# Patient Record
(demographics unavailable — no encounter records)

---

## 2025-07-21 NOTE — DISCUSSION/SUMMARY
[de-identified] : Acute on chronic lower back pain with acute on chronic left lumbar radiucopathy; These symptoms have been quite severe and required three days off of work last week. -MRI lumbar ordered which is imperative to help evaluate for herniated disc/stenosis and guide further treatment including appropriate PT, epidural steroid injections and/or surgery.  It is imperative to help us understand her pattern of symptoms to guide activities such as horse back riding -Meloxicam Rx given with instructions -f/u after MRI.

## 2025-07-21 NOTE — HISTORY OF PRESENT ILLNESS
[de-identified] : DALY GUNTER is a 29 year-year-old female who presents today with complaints of acute on chronic lower back pain for the past week. The pain is described as sharp, aching, and is intermittent. Patient reports the pain began suddenly and was in much pain couldn't walk for 3 days barely nor work. Symptoms are exacerbated by walking, bending, lifting and alleviated by Lidocaine patches, NSAIDs. Associated symptoms include stiffness, weakness, numbness, tingling, instability. Patient tried medications, with minimal relief.  Walks for exercise at least 1 hour per day.  Frequently gets left lumbar radiating pain to the foot with associated numbness/tingling.  Had a bad horseback riding accident when she was much younger as well as a year ago.  Works in banking, stands frequently.

## 2025-07-21 NOTE — PHYSICAL EXAM
[de-identified] : Physical Exam:  General: patient is well developed, well nourished, in no acute  distress, alert and oriented x 3.   Mood and affect: normal  Respiratory: no respiratory distress noted  Skin: no scars over spine, skin intact, no erythema, increased warmth  Alignment:The spine is well compensated in the coronal and sagittal plane.    Gait: The patient is able to toe walk and heel walk without difficulty. The patient is able to tandem gait without difficutly.  Palpation: no tenderness to palpation spine or paraspinal region  Range of motion: Lumbar spine ROM is full  Neurologic Exam: Motor: Manual Muscle testing in the lower extremities is 5 out of 5 in all muscle groups. There is no evidence of muscular atrophy in the lower extremities. Sensory: Sensation to light touch is grossly intact in the lower extremities  Reflexes: DTR are present and symmetric throughout, no clonus, plantar responses are flexor  Hip Exam: No pain with internal or external rotation of hips bilaterally  Special tests: Straight leg raise test negative.  Cross straight leg test negative.  LAZARA test negative  Vascular: Examination of the peripheral vascular system demonstrates no evidence of congestion or edema. no evidence of lymphedema bilateral lower extremities, pulses are present and symmetric in both lower extremities.  [de-identified] : Xray 7/21/2025 my read: mild disc degeneration L5/S1.  no scoliosis, no fractures, no instability